# Patient Record
Sex: MALE | Race: WHITE | NOT HISPANIC OR LATINO | URBAN - METROPOLITAN AREA
[De-identification: names, ages, dates, MRNs, and addresses within clinical notes are randomized per-mention and may not be internally consistent; named-entity substitution may affect disease eponyms.]

---

## 2024-07-23 ENCOUNTER — HOSPITAL ENCOUNTER (OUTPATIENT)
Dept: RADIOLOGY | Facility: HOSPITAL | Age: 24
Discharge: HOME | End: 2024-07-23
Payer: COMMERCIAL

## 2024-07-23 ENCOUNTER — OFFICE VISIT (OUTPATIENT)
Dept: ORTHOPEDIC SURGERY | Facility: HOSPITAL | Age: 24
End: 2024-07-23
Payer: COMMERCIAL

## 2024-07-23 DIAGNOSIS — M79.644 FINGER PAIN, RIGHT: ICD-10-CM

## 2024-07-23 DIAGNOSIS — M79.644 FINGER PAIN, RIGHT: Primary | ICD-10-CM

## 2024-07-23 PROCEDURE — 99213 OFFICE O/P EST LOW 20 MIN: CPT | Performed by: STUDENT IN AN ORGANIZED HEALTH CARE EDUCATION/TRAINING PROGRAM

## 2024-07-23 PROCEDURE — 99203 OFFICE O/P NEW LOW 30 MIN: CPT | Performed by: STUDENT IN AN ORGANIZED HEALTH CARE EDUCATION/TRAINING PROGRAM

## 2024-07-23 PROCEDURE — 73140 X-RAY EXAM OF FINGER(S): CPT | Mod: RT

## 2024-07-23 ASSESSMENT — PAIN - FUNCTIONAL ASSESSMENT: PAIN_FUNCTIONAL_ASSESSMENT: 0-10

## 2024-07-23 ASSESSMENT — PAIN DESCRIPTION - DESCRIPTORS: DESCRIPTORS: DISCOMFORT;SORE

## 2024-07-23 ASSESSMENT — PAIN SCALES - GENERAL: PAINLEVEL_OUTOF10: 8

## 2024-07-23 NOTE — PROGRESS NOTES
History of Present Illness   Patient presents today for evaluation of side: right upper extremity pain.    The patient sustained an acute injury in February.  He notes that he jammed his finger.  He never sought treatment at that time.  He slowly developed a deformity to his finger.  He believes that he broke his finger and it healed incorrectly.  Denies any loss of consciousness or additional significant injuries.  The patient denies any current numbness or tingling.  The pain is mild to moderate.  It is worse with activity or when the fingers straighten     Past Medical History:   Diagnosis Date    Personal history of other infectious and parasitic diseases 09/28/2016    History of infectious mononucleosis       Medication Documentation Review Audit       Reviewed by TAINA Miranda on 07/23/24 at 1457      Medication Order Taking? Sig Documenting Provider Last Dose Status            No Medications to Display                                   Not on File    Social History     Socioeconomic History    Marital status: Single     Spouse name: Not on file    Number of children: Not on file    Years of education: Not on file    Highest education level: Not on file   Occupational History    Not on file   Tobacco Use    Smoking status: Not on file    Smokeless tobacco: Not on file   Substance and Sexual Activity    Alcohol use: Not on file    Drug use: Not on file    Sexual activity: Not on file   Other Topics Concern    Not on file   Social History Narrative    Not on file     Social Determinants of Health     Financial Resource Strain: Not on file   Food Insecurity: Not on file   Transportation Needs: Not on file   Physical Activity: Not on file   Stress: Not on file   Social Connections: Not on file   Intimate Partner Violence: Not on file   Housing Stability: Not on file       History reviewed. No pertinent surgical history.       Review of Systems   GENERAL: Negative  GI: Negative  MUSCULOSKELETAL: See  HPI  SKIN: Negative  NEURO:  Negative     Physical Exam:  side: right upper extremity:  Skin healthy to gross inspection, no breakdown  No swelling / ecchymosis noted  There is a PIP joint contracture to the right small finger.  There is approximately 60 degrees of the flexion contracture.  There is resting hyperextension of the MP joint.  There is no DIP joint hyperextension.  Negative Dawit's test  Intact flexion and extension of 1st IP joint and finger abduction  Sensation intact to light touch medial / ulnar and radial nerve distribution   Good cap refill     Imaging  XR of the right small finger was taken in office today  Multiple views of the right small finger demonstrate no fracture or dislocation.  There is a PIP joint contracture.     Assessment   Patient with an PIP joint contracture.     Plan:  We discussed that the patient developed a PIP joint contracture after an injury.  He does not have a boutonniere deformity.  His central slip appears to be intact on Dawit's testing.  At this point I will refer him to occupational therapy.  They will provide him with a dynamic 3 point extension splint and splinting.  He should be able to improve his motion.

## 2024-08-14 ENCOUNTER — EVALUATION (OUTPATIENT)
Dept: OCCUPATIONAL THERAPY | Facility: HOSPITAL | Age: 24
End: 2024-08-14
Payer: COMMERCIAL

## 2024-08-14 DIAGNOSIS — M79.644 FINGER PAIN, RIGHT: Primary | ICD-10-CM

## 2024-08-14 PROCEDURE — L3925 FO PIP DIP JNT/SPRNG PRE OTS: HCPCS

## 2024-08-14 PROCEDURE — 97165 OT EVAL LOW COMPLEX 30 MIN: CPT | Mod: GO

## 2024-08-14 PROCEDURE — 97110 THERAPEUTIC EXERCISES: CPT | Mod: GO

## 2024-08-14 NOTE — PROGRESS NOTES
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 He reports mild pain only when trying to force the joint into full extension. The injury doesn't affect him functionally, but he wants the finger to straighten fully. I fitted him with a prefab dynamic PIP extension orthosis and instructed him in its use and care. I also instructed him in lumbrical muscle strengthening that will help with PIP joint extension.      Plan: follow up if he feels he is not making progress; therapeutic exercise, therapeutic activity, manual therapy,  fluidotherapy, ultrasound, kinesiotaping, orthosis fabrication  Goals  In 8 weeks, Oracio will achieve the following goals:  He will independently state and demonstrate splint wear and care techniques.     MET  He will regain full R SF PIP extension.  He will decrease his QuickDASH score by 2-3 points (5.0 at eval).  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 Plays violin. Here for a splint that Dr. Rodríguez told him about.    Pain  Mostly 0/10, only hurts when trying to straighten it    Objective   Outcome Measure: QuickDASH: 5.0    Edema:  none    Sensation  WNL     AROM  Right Hand AROM (degrees)   MCP PIP DIP DPC   IF        MF       RF       SF +15/full -32/full 0/full    Thumb       Thumb RABD       Thumb PABD         Special Tests  Finger/Thumb:  Dawit's Test: negative    Treatment  Education: home exercise program, plan of care, activity modification, pain management, and pertinent anatomy     Orthosis:  Prefab dynamic PIP extension orthosis (small white)  Therapeutic Exercise:  Lumbrical in interosseous muscle strengthening    HEP:    OT Evaluation Time Entry  OT Evaluation (Low) Time Entry: 15  OT Therapeutic Procedures Time Entry  Therapeutic Exercise Time Entry: 15

## 2024-08-15 ASSESSMENT — ENCOUNTER SYMPTOMS
LOSS OF SENSATION IN FEET: 0
DEPRESSION: 0
OCCASIONAL FEELINGS OF UNSTEADINESS: 0

## 2024-09-03 ENCOUNTER — APPOINTMENT (OUTPATIENT)
Dept: ORTHOPEDIC SURGERY | Facility: HOSPITAL | Age: 24
End: 2024-09-03
Payer: COMMERCIAL

## 2024-09-09 ENCOUNTER — OFFICE VISIT (OUTPATIENT)
Dept: ORTHOPEDIC SURGERY | Facility: HOSPITAL | Age: 24
End: 2024-09-09
Payer: COMMERCIAL

## 2024-09-09 DIAGNOSIS — M79.644 FINGER PAIN, RIGHT: Primary | ICD-10-CM

## 2024-09-09 PROCEDURE — 99212 OFFICE O/P EST SF 10 MIN: CPT | Performed by: STUDENT IN AN ORGANIZED HEALTH CARE EDUCATION/TRAINING PROGRAM

## 2024-09-11 NOTE — PROGRESS NOTES
History of Present Illness   Patient presents today for evaluation of side: right upper extremity pain.    The patient sustained an acute injury in February.  He notes that he jammed his finger.  He never sought treatment at that time.  He slowly developed a deformity to his finger.  He believes that he broke his finger and it healed incorrectly.  Denies any loss of consciousness or additional significant injuries.  The patient denies any current numbness or tingling.  The pain is mild to moderate.  It is worse with activity or when the fingers straighten       9/11/2024 follow-up:  Patient presents for follow-up today.  He is continue to work on extension of his right small finger.  Attended a therapy session and obtained a 3 point dynamic splint for his PIP joint contracture.  He has been wearing this and been compliant with the treatment.  He has regained full extension of his PIP joint.  Overall he is very happy with his outcome.  Past Medical History:   Diagnosis Date    Personal history of other infectious and parasitic diseases 09/28/2016    History of infectious mononucleosis       Medication Documentation Review Audit       Reviewed by TAINA Miranda on 07/23/24 at 1457      Medication Order Taking? Sig Documenting Provider Last Dose Status            No Medications to Display                                   Not on File    Social History     Socioeconomic History    Marital status: Single     Spouse name: Not on file    Number of children: Not on file    Years of education: Not on file    Highest education level: Not on file   Occupational History    Not on file   Tobacco Use    Smoking status: Not on file    Smokeless tobacco: Not on file   Substance and Sexual Activity    Alcohol use: Not on file    Drug use: Not on file    Sexual activity: Not on file   Other Topics Concern    Not on file   Social History Narrative    Not on file     Social Determinants of Health     Financial Resource Strain: Not  on file   Food Insecurity: Not on file   Transportation Needs: Not on file   Physical Activity: Not on file   Stress: Not on file   Social Connections: Not on file   Intimate Partner Violence: Not on file   Housing Stability: Not on file       No past surgical history on file.       Review of Systems   GENERAL: Negative  GI: Negative  MUSCULOSKELETAL: See HPI  SKIN: Negative  NEURO:  Negative     Physical Exam:  side: right upper extremity:  Skin healthy to gross inspection, no breakdown  No swelling / ecchymosis noted  He has full flexion extension of the small finger.  He can make composite fist to distal palmar crease.  He can fully extend the PIP joint.  His contracture has resolved.  There is no DIP joint hyperextension.  Negative Dawit's test  Intact flexion and extension of 1st IP joint and finger abduction  Sensation intact to light touch medial / ulnar and radial nerve distribution   Good cap refill     Imaging  XR of the right small finger was reviewed in office today  Multiple views of the right small finger demonstrate no fracture or dislocation.  There is a PIP joint contracture.     Assessment   Resolved small finger PIP flexion contracture     Plan:  The patient PIP joint flexion contracture has fully resolved.  At this point he can return to activities as tolerated.  If his contracture begins to recur that he should start using his dynamic 3 point splint again.  He will follow-up as needed

## 2024-11-29 ENCOUNTER — DOCUMENTATION (OUTPATIENT)
Dept: PHYSICAL THERAPY | Facility: HOSPITAL | Age: 24
End: 2024-11-29
Payer: COMMERCIAL